# Patient Record
Sex: MALE | Race: WHITE
[De-identification: names, ages, dates, MRNs, and addresses within clinical notes are randomized per-mention and may not be internally consistent; named-entity substitution may affect disease eponyms.]

---

## 2018-03-22 ENCOUNTER — HOSPITAL ENCOUNTER (INPATIENT)
Dept: HOSPITAL 43 - DL.ED | Age: 38
LOS: 2 days | Discharge: HOME | DRG: 134 | End: 2018-03-24
Attending: HOSPITALIST | Admitting: HOSPITALIST
Payer: COMMERCIAL

## 2018-03-22 DIAGNOSIS — I26.99: Primary | ICD-10-CM

## 2018-03-22 DIAGNOSIS — I82.401: ICD-10-CM

## 2018-03-22 LAB
CHLORIDE SERPL-SCNC: 103 MMOL/L (ref 101–111)
SODIUM SERPL-SCNC: 139 MMOL/L (ref 135–145)

## 2018-03-22 NOTE — EDM.PDOC
ED HPI GENERAL MEDICAL PROBLEM





- General


Chief Complaint: Chest Pain


Stated Complaint: CHEST SOB AND LEG PAIN


Time Seen by Provider: 03/22/18 20:30


Source of Information: Reports: Patient


History Limitations: Reports: No Limitations





- History of Present Illness


INITIAL COMMENTS - FREE TEXT/NARRATIVE: 





ED with complaint of right leg pain for one week, increasing chest pain over 

past since Saturday, minimal cough, feeling wheezy, No hx asthma, No fever. 

Decrease in activity tolerance.  Has been traveling since Saturday in car and 

by airplane. Family hx positive for blood clots. 


  ** Right Lower Leg


Pain Score (Numeric/FACES): 3





  ** Middle Chest


Pain Score (Numeric/FACES): 7





- Related Data


 Allergies











Allergy/AdvReac Type Severity Reaction Status Date / Time


 


No Known Allergies Allergy   Verified 03/23/18 00:08











Home Meds: 


 Home Meds





. [No Known Home Meds]  03/22/18 [History]











ED ROS GENERAL





- Review of Systems


Review Of Systems: See Below


Constitutional: Reports: Fatigue


HEENT: Reports: No Symptoms


Respiratory: Reports: Shortness of Breath, Wheezing, Pleuritic Chest Pain, 

Cough.  Denies: Hemoptysis


Cardiovascular: Reports: Chest Pain, Dyspnea on Exertion, Orthopnea.  Denies: 

Lightheadedness


GI/Abdominal: Reports: No Symptoms


Musculoskeletal: Reports: Leg Pain


Skin: Reports: No Symptoms


Neurological: Reports: No Symptoms





ED EXAM, GENERAL





- Physical Exam


Exam: See Below


Exam Limited By: No Limitations


General Appearance: Alert, No Apparent Distress, Anxious


Eye Exam: Bilateral Eye: EOMI


Ears: Normal External Exam, Normal Canal


Ear Exam: Bilateral Ear: Auricle Normal, Canal Normal, TM normal


Nose: Normal Inspection


Throat/Mouth: Normal Inspection


Head: Atraumatic, Normocephalic


Neck: Normal Inspection


Respiratory/Chest: Normal Breath Sounds


Cardiovascular: Normal Peripheral Pulses, Regular Rate, Rhythm


GI/Abdominal: Normal Bowel Sounds, Soft


Back Exam: Normal Inspection, Full Range of Motion


Extremities: Normal Inspection, Normal Range of Motion, Jairo's Sign (right), 

Leg Pain (right calf)


Neurological: Alert, Oriented, Normal Cognition


Psychiatric: Normal Affect, Normal Mood


Skin Exam: Warm, Dry, Intact, Normal Color





Course





- Vital Signs


Last Recorded V/S: 


 Last Vital Signs











Temp  97.7 F   03/22/18 22:53


 


Pulse  78   03/22/18 22:53


 


Resp  20   03/22/18 22:53


 


BP  150/91 H  03/22/18 22:53


 


Pulse Ox  100   03/22/18 22:53














- Orders/Labs/Meds


Orders: 


 Active Orders 24 hr











 Category Date Time Status


 


 Heparin Sodium/D5W [Heparin 25,000 Units in D5W 500 ML] Med  03/22/18 21:30 

Active





 25,000 units in 500 ml IV TITRATE   








 Medication Orders





Heparin Sodium/Dextrose (Heparin 25,000 Units In D5w 500 Ml)  25,000 units in 

500 mls @ 32.658 mls/hr IV TITRATE [facility]; 18 UNITS/KG/HR


   PRN Reason: Protocol


   Last Admin: 03/22/18 21:53  Dose: 18 units/kg/hr, 32.658 mls/hr


Sodium Chloride (Normal Saline)  1,000 mls @ 75 mls/hr IV ASDIRECTED [facility]


Ondansetron HCl (Zofran)  4 mg IVPUSH Q6H PRN


   PRN Reason: Nausea/Vomiting








Labs: 


 Laboratory Tests











  03/22/18 03/22/18 03/22/18 Range/Units





  20:40 20:40 20:40 


 


WBC  10.7 H    (5.0-10.0)  10^3/uL


 


RBC  5.62    (4.6-6.2)  10^6/uL


 


Hgb  16.8    (14.0-18.0)  g/dL


 


Hct  47.8    (40.0-54.0)  %


 


MCV  85.1    ()  fL


 


MCH  29.9    (27.0-34.0)  pg


 


MCHC  35.1 H    (33.0-35.0)  g/dL


 


Plt Count  207    (150-450)  10^3/uL


 


Neut % (Auto)  68.6    (42.2-75.2)  %


 


Lymph % (Auto)  22.9    (20.5-50.1)  %


 


Mono % (Auto)  6.1    (2-8)  %


 


Eos % (Auto)  2.2    (1.0-3.0)  %


 


Baso % (Auto)  0.2    (0.0-1.0)  %


 


PT   10.4   (9.0-12.0)  SEC


 


INR   1.0   (0.9-1.2)  


 


D-Dimer, Quantitative   2680 H   (0-400)  ng/mL


 


Sodium    139  (135-145)  mmol/L


 


Potassium    3.6  (3.6-5.0)  mmol/L


 


Chloride    103  (101-111)  mmol/L


 


Carbon Dioxide    27.0  (21.0-31.0)  mmol/L


 


Anion Gap    12.6  


 


BUN    13  (7-18)  mg/dL


 


Creatinine    0.9  (0.6-1.3)  mg/dL


 


Est Cr Clr Drug Dosing    118.29  mL/min


 


Estimated GFR (MDRD)    > 60  


 


BUN/Creatinine Ratio    14.44  


 


Glucose    115 H  ()  mg/dL


 


Calcium    9.2  (8.4-10.2)  mg/dl


 


Total Bilirubin    0.8  (0.2-1.0)  mg/dL


 


AST    34  (10-42)  IU/L


 


ALT    51  (10-60)  IU/L


 


Alkaline Phosphatase    101  ()  IU/L


 


Troponin I    < 0.02  (0.00-0.02)  ng/ml


 


Total Protein    7.2  (6.7-8.2)  g/dl


 


Albumin    4.4  (3.2-5.5)  g/dl


 


Globulin    2.8  


 


Albumin/Globulin Ratio    1.57  











Meds: 


Medications











Generic Name Dose Route Start Last Admin





  Trade Name Freq  PRN Reason Stop Dose Admin


 


Heparin Sodium/Dextrose  25,000 units in 500 mls @ 32.658 mls/hr  03/22/18 21:

30  03/22/18 21:53





  Heparin 25,000 Units In D5w 500 Ml  IV   18 units/kg/hr





  TITRATE [facility]   32.658 mls/hr





  Protocol   Administration





  18 UNITS/KG/HR   


 


Sodium Chloride  1,000 mls @ 75 mls/hr  03/22/18 22:45  





  Normal Saline  IV   





  ASDIRECTED [facility]   


 


Ondansetron HCl  4 mg  03/22/18 22:32  





  Zofran  IVPUSH   





  Q6H PRN   





  Nausea/Vomiting   














Discontinued Medications














Generic Name Dose Route Start Last Admin





  Trade Name Freq  PRN Reason Stop Dose Admin


 


Heparin Sodium (Porcine)  5,000 units  03/22/18 21:18  03/22/18 21:51





  Heparin Sodium  IVPUSH  03/22/18 21:19  5,000 units





  ONETIME ONE   Administration


 


Heparin Sodium/Dextrose  Confirm  03/22/18 21:30  03/22/18 21:37





  Heparin 25,000 Units In D5w 500 Ml  Administered  03/22/18 21:31  Not Given





  Dose   





  500 mls @ as directed   





  .ROUTE   





  .STK-MED ONE   


 


Iopamidol  100 ml  03/22/18 21:20  03/22/18 21:30





  Isovue-370 (76%)  IVPUSH  03/22/18 21:21  100 ml





  ONETIME ONE   Administration














- Radiology Interpretation


Free Text/Narrative:: 





CXR negative


CT angio chest Thrombus in right main pulmonary artery as well as segmental 

branches to the right upper and right lower lobes, as well as segmental and 

subsegmental branches to the left lower lobe





- Re-Assessments/Exams


Free Text/Narrative Re-Assessment/Exam: 





03/22/18 22:08


Wells Criteria Scoring 7.5.


CT angio results pending, Heparin infusing


03/22/18 22:28


Dr Velasco accepting of patient for admission 








Departure





- Departure


Time of Disposition: 22:27


Disposition: Admitted As Inpatient 66


Condition: Good


Clinical Impression: 


Pulmonary embolism


Qualifiers:


 Pulmonary embolism type: other Chronicity: acute Acute cor pulmonale presence: 

without acute cor pulmonale Qualified Code(s): I26.99 - Other pulmonary 

embolism without acute cor pulmonale








- Discharge Information





- My Orders


Last 24 Hours: 


My Active Orders





03/22/18 21:30


Heparin Sodium/D5W [Heparin 25,000 Units in D5W 500 ML] 25,000 units in 500 ml 

IV TITRATE 














- Assessment/Plan


Last 24 Hours: 


My Active Orders





03/22/18 21:30


Heparin Sodium/D5W [Heparin 25,000 Units in D5W 500 ML] 25,000 units in 500 ml 

IV TITRATE

## 2018-03-23 NOTE — EKG
03/23/2018 - ANNIE QUIROZ -

 

TIME:  2023 hours.

 

EKG shows normal sinus rhythm with a rate of 74 per minute.  Nonspecific T-wave

abnormalities.

 

DD:  03/23/2018 17:44:26

DT:  03/23/2018 19:55:10

[facility]

Job #:  [id number]

## 2018-03-23 NOTE — PCM.HP
H&P History of Present Illness





- General


Date of Service: 03/22/18


Admit Problem/Dx: 


 Admission Diagnosis/Problem





Admission Diagnosis/Problem      Shortness of breath at rest








Source of Information: Patient


History Limitations: Reports: No Limitations





- History of Present Illness


Initial Comments - Free Text/Narative: 





The patient presented with complaint of right leg pain that has been going on 

for one week. Gradually worsened and he developed associated shortness of 

breath.Also started having chest pain at the retrosternal area. The patient 

denies fever chills, nausea or vomiting.


The patient's sister has a history of protein S deficiency and has had blood 

clots. Patient's mother also has had blood clots.


Patient was on a long distance travel. However he started having pain of the 

right lower extremity before the trip


Onset of Symptoms: Reports: Other (one week)


Duration of Symptoms: Reports: Getting Worse


Quality: Reports: Burning


Worsens with: Reports: Movement


Associated Symptoms: Reports: Chest Pain, Shortness of Breath


  ** Right Lower Leg


Pain Score (Numeric/FACES): 1





  ** Middle Chest


Pain Score (Numeric/FACES): 7





- Related Data


Allergies/Adverse Reactions: 


 Allergies











Allergy/AdvReac Type Severity Reaction Status Date / Time


 


No Known Allergies Allergy   Verified 03/23/18 00:08











Home Medications: 


 Home Meds





. [No Known Home Meds]  03/22/18 [History]











Past Medical History





- Past Health History


Medical/Surgical History: Denies Medical/Surgical History





- Infectious Disease History


Infectious Disease History: Reports: Chicken Pox, Other (See Below)


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 

pt does not know how to spell)





Social & Family History





- Family History


Cardiac: Reports: Heart Failure


Respiratory: Reports: Interstitial Lung Disease, PE, Other (See Below)


Other Respiratory Family Hisory: in legs, both mother, brother and sister


GI: Reports: Cholelithiasis


Oncologic: Reports: None





- Tobacco Use


Smoking Status *Q: Never Smoker


Second Hand Smoke Exposure: No





- Caffeine Use


Caffeine Use: Reports: Soda





- Recreational Drug Use


Recreational Drug Use: No





H&P Review of Systems





- Review of Systems:


Review Of Systems: See Below


General: Reports: No Symptoms


HEENT: Reports: No Symptoms


Pulmonary: Reports: No Symptoms, Shortness of Breath, Wheezing


Gastrointestinal: Reports: No Symptoms


Genitourinary: Reports: No Symptoms


Musculoskeletal: Reports: No Symptoms


Skin: Reports: No Symptoms


Psychiatric: Reports: No Symptoms


Hematologic/Lymphatic: Reports: No Symptoms





Exam





- Exam


Exam: See Below





- Vital Signs


Vital Signs: 


 Last Vital Signs











Temp  37.1 C   03/23/18 07:00


 


Pulse  66   03/23/18 07:00


 


Resp  14   03/23/18 07:00


 


BP  112/62   03/23/18 07:00


 


Pulse Ox  96   03/23/18 07:00











Weight: 91.263 kg





- Exam


General: Alert, Oriented, 4


HEENT: PERRLA, Hearing Intact, Mucosa Moist & Pink, Nares Patent, Normal Nasal 

Septum, Posterior Pharynx Clear, Conjunctiva Clear, EOMI, EACs Clear, TMs Clear


Lungs: Clear to Auscultation, Normal Respiratory Effort


Cardiovascular: Regular Rate, Regular Rhythm


GI/Abdominal Exam: Normal Bowel Sounds, Soft, Non-Tender, No Organomegaly, No 

Distention, No Abnormal Bruit, No Mass, Pelvis Stable


Back Exam: Normal Inspection, Full Range of Motion, NT


Extremities: Leg Pain


Neuro Extensive - Motor, Sensory, Reflexes: CN II-XII Intact, Normal Gait, 

Normal Reflexes


Psychiatric: Alert, Normal Affect





- Patient Data


Lab Results Last 24 hrs: 


 Laboratory Results - last 24 hr











  03/23/18 03/23/18 Range/Units





  03:55 10:03 


 


APTT  65.0 H  80.4 H  (22.0-34.0)  SEC











Result Diagrams: 


 03/22/18 20:40





 03/22/18 20:40





*Q Meaningful Use (ADM)





- VTE *Q


VTE Criteria *Q: 








- Stroke *Q


Stroke Criteria *Q: 








- AMI *Q


AMI Criteria *Q: 





Problem List Initiated/Reviewed/Updated: Yes


Orders Last 24hrs: 


 Active Orders 24 hr











 Category Date Time Status


 


 Venous Doppler Lwr Ext Bi [US] Routine Exams  03/23/18 10:21 Ordered


 


 Enoxaparin [Lovenox] Med  03/23/18 13:00 Ordered





 100 mg SUBCUT Q12HR   


 


 Warfarin Pharmacy to Dose [Pharmacy to Dose - Warfarin] Med  03/23/18 10:45 

Ordered





 1 dose .XX ASDIRECTED   








 Medication Orders





Enoxaparin Sodium (Lovenox)  100 mg SUBCUT Q12HR [facility]


Sodium Chloride (Normal Saline)  1,000 mls @ 75 mls/hr IV ASDIRECTED [facility]


   Last Admin: 03/23/18 04:51  Dose: 75 mls/hr


Ondansetron HCl (Zofran)  4 mg IVPUSH Q6H PRN


   PRN Reason: Nausea/Vomiting


Warfarin Sodium (Pharmacy To Dose - Warfarin)  1 dose .XX ASDIRECTED [facility]








Assessment/Plan Comment:: 





#. Acute pulmonary embolism


CT scan of the chest showed bilateral pulmonary embolism.


Patient has clot in the right pulmonary artery also





#. Right lower extremity pain


Patient likely has DVT of the right lower extremity.


The patient him back on a long distance travel area has a family history of 

protein S deficiency





#. Chest pain


This is as a result of acute pulmonary embolism





Plan:


Admit patient to medical floor


Start patient on continuous intravenous heparin weight-based protocol


Monitor PTT


Supplemental oxygen 


Obtain echocardiogram


We'll obtain ultrasound of right lower extremity


Discussed with the emergency room physician assistant

## 2018-03-23 NOTE — PCM.PN
- General Info


Date of Service: 03/23/18


Subjective Update: 





Patient was admitted with acute pulmonary embolism


Complains of pain right lower extremity


Does have some shortness of breath





- Review of Systems


General: Reports: No Symptoms


Pulmonary: Reports: Shortness of Breath


Cardiovascular: Reports: No Symptoms


Gastrointestinal: Reports: No Symptoms


Musculoskeletal: Reports: Leg Pain





- Patient Data


Vitals - Most Recent: 


 Last Vital Signs











Temp  36.4 C   03/23/18 11:00


 


Pulse  82   03/23/18 11:00


 


Resp  14   03/23/18 07:00


 


BP  148/80 H  03/23/18 11:00


 


Pulse Ox  95   03/23/18 11:00











Weight - Most Recent: 91.263 kg


I&O - Last 24 Hours: 


 Intake & Output











 03/22/18 03/23/18 03/23/18





 22:59 06:59 14:59


 


Intake Total  228 125


 


Output Total  400 400


 


Balance  -172 -275











Lab Results Last 24 Hours: 


 Laboratory Results - last 24 hr











  03/23/18 03/23/18 Range/Units





  03:55 10:03 


 


APTT  65.0 H  80.4 H  (22.0-34.0)  SEC











Med Orders - Current: 


 Current Medications





Enoxaparin Sodium (Lovenox)  100 mg SUBCUT Q12HR [facility]


Sodium Chloride (Normal Saline)  1,000 mls @ 75 mls/hr IV ASDIRECTED [facility]


   Last Admin: 03/23/18 04:51 Dose:  75 mls/hr


Ondansetron HCl (Zofran)  4 mg IVPUSH Q6H PRN


   PRN Reason: Nausea/Vomiting


Warfarin Sodium (Pharmacy To Dose - Warfarin)  1 dose .XX ASDIRECTED [facility]





Discontinued Medications





Heparin Sodium (Porcine) (Heparin Sodium)  5,000 units IVPUSH ONETIME ONE


   Stop: 03/22/18 21:19


   Last Admin: 03/22/18 21:51 Dose:  5,000 units


Heparin Sodium/Dextrose (Heparin 25,000 Units In D5w 500 Ml)  25,000 units in 

500 mls @ 32.658 mls/hr IV TITRATE [facility]; 18 UNITS/KG/HR


   PRN Reason: Protocol


   Last Titration: 03/23/18 10:35 Dose:  16 units/kg/hr, 29.03 mls/hr


Heparin Sodium/Dextrose (Heparin 25,000 Units In D5w 500 Ml) Confirm 

Administered Dose 500 mls @ as directed .ROUTE .STK-MED ONE


   Stop: 03/22/18 21:31


   Last Admin: 03/22/18 21:37 Dose:  Not Given


Iopamidol (Isovue-370 (76%))  100 ml IVPUSH ONETIME ONE


   Stop: 03/22/18 21:21


   Last Admin: 03/22/18 21:30 Dose:  100 ml











- Exam


General: Alert, Cooperative, No Acute Distress


Lungs: Clear to Auscultation


Cardiovascular: Regular Rate


GI/Abdominal Exam: Normal Bowel Sounds, Soft


Extremities: Leg Pain





- Problem List Review


Problem List Initiated/Reviewed/Updated: Yes





- My Orders


Last 24 Hours: 


My Active Orders





03/23/18 10:21


Venous Doppler Lwr Ext Bi [US] Routine 





03/23/18 10:45


Warfarin Pharmacy to Dose [Pharmacy to Dose - Warfarin]   1 dose .XX ASDIRECTED 





03/23/18 13:00


Enoxaparin [Lovenox]   100 mg SUBCUT Q12HR 














- Plan


Plan:: 





#. Acute pulmonary embolism


CT scan of the chest showed bilateral pulmonary embolism.


Patient has clot in the right pulmonary artery also





#. Right lower extremity pain


Patient likely has DVT of the right lower extremity.


The patient him back on a long distance travel area has a family history of 

protein S deficiency





#. Chest pain


This is as a result of acute pulmonary embolism





Plan:


Discontinue continuous intravenous heparin


Start patient on Lovenox 100 mg every 12 hours


Start patient on Coumadin


Monitor PT and INR


Obtain ultrasound of the lower extremities


Obtain echocardiogram

## 2018-03-24 NOTE — PCM.DCSUM1
**Discharge Summary





- Hospital Course


Free Text/Narrative:: 








The patient presented with complaint of chest pain and shortness of breath 

which was going on for one week. He had a CT scan of the chest which showed 

bilateral pulmonary embolism. There were clots in the right pulmonary artery. 

We proceeded to obtain ultrasound of the extremities there was no evidence of 

clots. Patient's sister has history of protein S deficiency. The patient should 

be tested as outpatient for hypercoagulable conditions. The test should be done 

includes protein C and S deficiency as a, factor V Leiden deficiency, 

prothrombin complex, lupus anticoagulant ect


The patient has been started on subcutaneous Lovenox and Coumadin. He will have 

PT and INR checked in 3 days I will follow up with primary care provider in one 

week





#. Acute pulmonary embolism


CT scan of the chest showed bilateral pulmonary embolism.


Patient has clot in the right pulmonary artery also





#. Right lower extremity pain


Patient likely has DVT of the right lower extremity.


The patient him back on a long distance travel area has a family history of 

protein S deficiency





#. Chest pain


This is as a result of acute pulmonary embolism





- Discharge Data


Discharge Date: 03/24/18


Discharge Disposition: Home, Self-Care 01


Condition: Stable





- Patient Instructions


Diet: Regular Diet as Tolerated


Activity: As Tolerated


Driving: [place] Drive Today


Showering/Bathing: May Shower





- Discharge Plan


Prescriptions/Med Rec: 


Enoxaparin [Lovenox] 100 mg SUBCUT Q12H 5 Days #10 syringe


Warfarin Sodium [Coumadin] 6 mg PO DAILY #20 tablet


Home Medications: 


 Home Meds





Acetaminophen [Tylenol] 650 mg PO Q6H PRN  tablet 03/24/18 [Rx]


Enoxaparin [Lovenox] 100 mg SUBCUT Q12H 5 Days #10 syringe 03/24/18 [Rx]


Warfarin Sodium [Coumadin] 6 mg PO DAILY #20 tablet 03/24/18 [Rx]








Patient Handouts:  What You Need to Know About Warfarin, Vascular Ultrasound, 

Enoxaparin injection


Referrals: 


PCP,Unobtain [Ordering Only Provider] - 





- General Info


Admission Dx/Problem (Free Text: 


 Admission Diagnosis/Problem





Admission Diagnosis/Problem      Shortness of breath at rest











- Review of Systems


General: Reports: No Symptoms


Pulmonary: Reports: Shortness of Breath


Cardiovascular: Reports: No Symptoms


Gastrointestinal: Reports: No Symptoms


Genitourinary: Reports: No Symptoms


Skin: Reports: No Symptoms





- Patient Data


Vitals - Most Recent: 


 Last Vital Signs











Temp  36.7 C   03/24/18 07:00


 


Pulse  69   03/24/18 07:00


 


Resp  18   03/24/18 07:00


 


BP  115/65   03/24/18 07:00


 


Pulse Ox  95   03/24/18 07:00











Weight - Most Recent: 91.263 kg


I&O - Last 24 hours: 


 Intake & Output











 03/23/18 03/24/18 03/24/18





 22:59 06:59 14:59


 


Intake Total 920 400 


 


Output Total 500 900 


 


Balance 420 -500 











Lab Results - Last 24 hrs: 


 Laboratory Results - last 24 hr











  03/23/18 03/23/18 Range/Units





  10:03 13:00 


 


APTT  80.4 H   (22.0-34.0)  SEC


 


Troponin I   < 0.02  (0.00-0.02)  ng/ml











Med Orders - Current: 


 Current Medications





Acetaminophen (Tylenol)  650 mg PO Q6H PRN


   PRN Reason: Pain


   Last Admin: 03/23/18 18:12 Dose:  650 mg


Enoxaparin Sodium (Lovenox)  100 mg SUBCUT Q12H [facility]


   Last Admin: 03/24/18 05:48 Dose:  100 mg


Morphine Sulfate (Morphine)  2 mg IVPUSH Q4H PRN


   PRN Reason: Pain


Ondansetron HCl (Zofran)  4 mg IVPUSH Q6H PRN


   PRN Reason: Nausea/Vomiting


Warfarin Sodium (Pharmacy To Dose - Warfarin)  1 dose .XX ASDIRECTED [facility]





Discontinued Medications





Heparin Sodium (Porcine) (Heparin Sodium)  5,000 units IVPUSH ONETIME ONE


   Stop: 03/22/18 21:19


   Last Admin: 03/22/18 21:51 Dose:  5,000 units


Heparin Sodium/Dextrose (Heparin 25,000 Units In D5w 500 Ml)  25,000 units in 

500 mls @ 32.658 mls/hr IV TITRATE [facility]; 18 UNITS/KG/HR


   PRN Reason: Protocol


   Last Titration: 03/23/18 10:35 Dose:  16 units/kg/hr, 29.03 mls/hr


Heparin Sodium/Dextrose (Heparin 25,000 Units In D5w 500 Ml) Confirm 

Administered Dose 500 mls @ as directed .ROUTE .STK-MED ONE


   Stop: 03/22/18 21:31


   Last Admin: 03/22/18 21:37 Dose:  Not Given


Sodium Chloride (Normal Saline)  1,000 mls @ 75 mls/hr IV ASDIRECTED [facility]


   Last Admin: 03/23/18 04:51 Dose:  75 mls/hr


Iopamidol (Isovue-370 (76%))  100 ml IVPUSH ONETIME ONE


   Stop: 03/22/18 21:21


   Last Admin: 03/22/18 21:30 Dose:  100 ml


Morphine Sulfate (Morphine)  2 mg IVPUSH ONETIME ONE


   Stop: 03/23/18 12:45


   Last Admin: 03/23/18 12:49 Dose:  2 mg


Warfarin Sodium (Coumadin)  10 mg PO ONETIME ONE


   Stop: 03/23/18 14:01


   Last Admin: 03/23/18 13:54 Dose:  10 mg











*Q Meaningful Use (DIS)





- VTE *Q


VTE Criteria *Q: 








- Stroke *Q


Stroke Criteria *Q: 








- AMI *Q


AMI Criteria *Q:

## 2018-03-28 ENCOUNTER — HOSPITAL ENCOUNTER (EMERGENCY)
Dept: HOSPITAL 43 - DL.ED | Age: 38
Discharge: NURSING, REHAB OR LONG-TERM CARE | End: 2018-03-28
Payer: COMMERCIAL

## 2018-03-28 DIAGNOSIS — Z79.01: ICD-10-CM

## 2018-03-28 DIAGNOSIS — I26.99: Primary | ICD-10-CM

## 2018-03-28 LAB
CHLORIDE SERPL-SCNC: 100 MMOL/L (ref 101–111)
SODIUM SERPL-SCNC: 138 MMOL/L (ref 135–145)

## 2018-03-28 PROCEDURE — 85379 FIBRIN DEGRADATION QUANT: CPT

## 2018-03-28 PROCEDURE — 96375 TX/PRO/DX INJ NEW DRUG ADDON: CPT

## 2018-03-28 PROCEDURE — 96374 THER/PROPH/DIAG INJ IV PUSH: CPT

## 2018-03-28 PROCEDURE — 80053 COMPREHEN METABOLIC PANEL: CPT

## 2018-03-28 PROCEDURE — 71260 CT THORAX DX C+: CPT

## 2018-03-28 PROCEDURE — 85610 PROTHROMBIN TIME: CPT

## 2018-03-28 PROCEDURE — 96376 TX/PRO/DX INJ SAME DRUG ADON: CPT

## 2018-03-28 PROCEDURE — 36415 COLL VENOUS BLD VENIPUNCTURE: CPT

## 2018-03-28 PROCEDURE — 85025 COMPLETE CBC W/AUTO DIFF WBC: CPT

## 2018-03-28 PROCEDURE — 96372 THER/PROPH/DIAG INJ SC/IM: CPT

## 2018-03-28 PROCEDURE — 93005 ELECTROCARDIOGRAM TRACING: CPT

## 2018-03-28 PROCEDURE — 99285 EMERGENCY DEPT VISIT HI MDM: CPT

## 2018-03-28 PROCEDURE — 84484 ASSAY OF TROPONIN QUANT: CPT

## 2018-03-28 NOTE — EDM.PDOC
ED HPI GENERAL MEDICAL PROBLEM





- General


Stated Complaint: [id number] BLOOD CLOT IN LUNG CHEST PAIN


Time Seen by Provider: 03/28/18 07:20


Source of Information: Reports: Patient, Old Records, RN, RN Notes Reviewed


History Limitations: Reports: No Limitations





- History of Present Illness


INITIAL COMMENTS - FREE TEXT/NARRATIVE: 


Pk is a 36 yo male who presents to the ED due to shortness of breath. He 

relates that he was diagnosed with a PE and was discharged last Thursday. He 

relates that he started experiencing shortness of breath on 3/17/18 after he he 

returned from vacation in which he was in the car for a prolonged period of 

time. He relates that 15 minutes ago he woke up with severe pain to the left 

side of his chest, increased shortness of breath, nausea, and diaphoresis. He 

describes the pain as sharp stabbing pain to his left chest. Denies radiation. 

He reports that he has continued his blood thinners since hospital discharge 

including lovenox and Coumadin. He has a family hx of clotting disorders. 

Denies fever or abd pain. 





Onset: Today


Onset Time: 07:00


Duration: Minutes:


Location: Reports: Chest


Quality: Reports: Sharp, Stabbing


Severity: Severe


Improves with: Reports: Medication


Worsens with: Reports: Movement


Associated Symptoms: Reports: Chest Pain, Shortness of Breath


  ** Left Upper Chest


Pain Score (Numeric/FACES): 10





- Related Data


 Allergies











Allergy/AdvReac Type Severity Reaction Status Date / Time


 


No Known Allergies Allergy   Verified 03/23/18 00:08











Home Meds: 


 Home Meds





Acetaminophen [Tylenol] 650 mg PO Q6H PRN  tablet 03/24/18 [Rx]


Enoxaparin [Lovenox] 100 mg SUBCUT Q12H 5 Days #10 syringe 03/24/18 [Rx]


Warfarin Sodium [Coumadin] 6 mg PO DAILY #20 tablet 03/24/18 [Rx]











Past Medical History





- Past Health History


Medical/Surgical History: Denies Medical/Surgical History





- Infectious Disease History


Infectious Disease History: Reports: Chicken Pox, Other (See Below)


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 

pt does not know how to spell)





Social & Family History





- Family History


Cardiac: Reports: Heart Failure


Respiratory: Reports: Interstitial Lung Disease, PE, Other (See Below)


Other Respiratory Family Hisory: in legs, both mother, brother and sister


GI: Reports: Cholelithiasis


Oncologic: Reports: None





- Tobacco Use


Smoking Status *Q: Never Smoker


Second Hand Smoke Exposure: No





- Caffeine Use


Caffeine Use: Reports: Soda





- Recreational Drug Use


Recreational Drug Use: No





ED ROS GENERAL





- Review of Systems


Review Of Systems: ROS reveals no pertinent complaints other than HPI.





ED EXAM, GENERAL





- Physical Exam


Exam: See Below


Exam Limited By: No Limitations


General Appearance: Alert, Severe Distress (He appears to be having significant 

pain on assessment. Unable to get comfortable on the cart due to pain and 

shortness of breath)


Eye Exam: Bilateral Eye: PERRL


Ears: Normal External Exam, Normal Canal, Hearing Grossly Normal, Normal TMs


Ear Exam: Bilateral Ear: Auricle Normal, Canal Normal, TM normal


Nose: Normal Inspection, Normal Mucosa, No Blood


Throat/Mouth: Normal Inspection, Normal Lips, Normal Teeth, Normal Gums, Normal 

Oropharynx, Normal Voice, No Airway Compromise


Head: Atraumatic, Normocephalic


Neck: Normal Inspection, Supple, Non-Tender, Full Range of Motion


Respiratory/Chest: Lungs Clear, No Accessory Muscle Use, Decreased Breath Sounds

, Other (Patient reports pain to left side of chest.)


Cardiovascular: Normal Peripheral Pulses, Regular Rate, Rhythm, No Edema, No 

Gallop, No JVD, No Murmur, No Rub


GI/Abdominal: Normal Bowel Sounds, Soft, Non-Tender, No Organomegaly, No 

Distention, No Abnormal Bruit, No Mass


 (Male) Exam: Deferred


Rectal (Males) Exam: Deferred


Back Exam: Normal Inspection, Full Range of Motion, NT


Extremities: Normal Inspection, Normal Range of Motion, Non-Tender, Normal 

Capillary Refill, No Pedal Edema


Neurological: Alert, Oriented, CN II-XII Intact, Normal Cognition, Normal Gait, 

Normal Reflexes, No Motor/Sensory Deficits


Psychiatric: Normal Affect, Normal Mood


Skin Exam: Warm, Intact, Normal Color, No Rash, Diaphoretic


Lymphatic: No Adenopathy





EKG INTERPRETATION


EKG Date: 03/28/18


Time: 07:18


Rhythm: NSR


Axis: Normal


P-Wave: Present


QRS: Normal


ST-T: Normal


QT: Normal


Comparison: No Change





Course





- Vital Signs


Last Recorded V/S: 


 Last Vital Signs











Temp  98.8 F   03/28/18 07:31


 


Pulse  74   03/28/18 07:50


 


Resp  20   03/28/18 07:50


 


BP  124/88   03/28/18 07:50


 


Pulse Ox  98   03/28/18 07:50














- Orders/Labs/Meds


Orders: 


 Active Orders 24 hr











 Category Date Time Status


 


 EKG Documentation Completion [RC] URGENT Care  03/28/18 07:16 Active


 


 Chest w Cont [CT] Urgent Exams  03/28/18 07:59 Taken











Labs: 


 Laboratory Tests











  03/28/18 03/28/18 03/28/18 Range/Units





  07:29 07:29 07:29 


 


WBC   8.8   (5.0-10.0)  10^3/uL


 


RBC   5.67   (4.6-6.2)  10^6/uL


 


Hgb   16.9   (14.0-18.0)  g/dL


 


Hct   48.6   (40.0-54.0)  %


 


MCV   85.7   ()  fL


 


MCH   29.8   (27.0-34.0)  pg


 


MCHC   34.8   (33.0-35.0)  g/dL


 


Plt Count   268   (150-450)  10^3/uL


 


Neut % (Auto)   64.2   (42.2-75.2)  %


 


Lymph % (Auto)   21.9   (20.5-50.1)  %


 


Mono % (Auto)   10.3 H   (2-8)  %


 


Eos % (Auto)   3.1 H   (1.0-3.0)  %


 


Baso % (Auto)   0.5   (0.0-1.0)  %


 


PT  17.3 H D    (9.0-12.0)  SEC


 


INR  1.7 H    (0.9-1.2)  


 


D-Dimer, Quantitative  1460 H    (0-400)  ng/mL


 


Sodium    138  (135-145)  mmol/L


 


Potassium    4.1  (3.6-5.0)  mmol/L


 


Chloride    100 L  (101-111)  mmol/L


 


Carbon Dioxide    28.0  (21.0-31.0)  mmol/L


 


Anion Gap    14.1  


 


BUN    12  (7-18)  mg/dL


 


Creatinine    1.0  (0.6-1.3)  mg/dL


 


Est Cr Clr Drug Dosing    106.46  mL/min


 


Estimated GFR (MDRD)    > 60  


 


BUN/Creatinine Ratio    12.00  


 


Glucose    108 H  ()  mg/dL


 


Calcium    9.3  (8.4-10.2)  mg/dl


 


Total Bilirubin    0.9  (0.2-1.0)  mg/dL


 


AST    73 H  (10-42)  IU/L


 


ALT    216 H  (10-60)  IU/L


 


Alkaline Phosphatase    179 H  ()  IU/L


 


Troponin I    < 0.02  (0.00-0.02)  ng/ml


 


Total Protein    7.9  (6.7-8.2)  g/dl


 


Albumin    4.3  (3.2-5.5)  g/dl


 


Globulin    3.6  


 


Albumin/Globulin Ratio    1.19  











Meds: 


Medications














Discontinued Medications














Generic Name Dose Route Start Last Admin





  Trade Name Freq  PRN Reason Stop Dose Admin


 


Enoxaparin Sodium  100 mg  03/28/18 09:13  





  Lovenox  SUBCUT  03/28/18 09:14  





  ONETIME ONE   





     





     





     





     


 


Iopamidol  100 ml  03/28/18 07:59  03/28/18 08:34





  Isovue-370 (76%)  IVPUSH  03/28/18 08:00  75 ml





  ONETIME ONE   Administration





     





     





     





     


 


Morphine Sulfate  2 mg  03/28/18 07:23  03/28/18 07:29





  Morphine  IVPUSH  03/28/18 07:24  2 mg





  ONETIME ONE   Administration





     





     





     





     


 


Morphine Sulfate  2 mg  03/28/18 08:29  03/28/18 08:33





  Morphine  IVPUSH  03/28/18 08:30  2 mg





  ONETIME ONE   Administration





     





     





     





     


 


Ondansetron HCl  4 mg  03/28/18 07:34  03/28/18 07:40





  Zofran  IV  03/28/18 07:35  4 mg





  ONETIME ONE   Administration





     





     





     





     














Departure





- Departure


Time of Disposition: 09:15


Disposition: DC/Tfer to Acute Hospital 02


Clinical Impression: 


Pulmonary embolism


Qualifiers:


 Pulmonary embolism type: other Chronicity: acute Acute cor pulmonale presence: 

without acute cor pulmonale Qualified Code(s): I26.99 - Other pulmonary 

embolism without acute cor pulmonale








- Discharge Information


Forms:  Interfacility Transfer EMTALA

## 2018-03-28 NOTE — EKG
03/28/2018 - ANNIE QUIROZ -

 

TIME:  07:18 a.m.

 

EKG shows normal sinus rhythm, rate of 72 per minute.  Nonspecific T-wave

abnormalities in inferior leads.

 

DD:  03/28/2018 15:12:23

DT:  03/28/2018 16:09:28

[facility]

Job #:  [id number]

## 2018-03-28 NOTE — CT
CLINICAL HISTORY: 37-year-old 206 pound male with severe substernal chest pain, left of midline, and 
abnormally elevated serum D dimer (1460) who, significantly, was reported one week ago to have "exten
sive, bilateral pulmonary artery thrombosis (PE)" that was subsequently treated with heparin, Lovenox
 and now Coumadin. Reevaluate please.

 

SCAN TECHNIQUE: Volume acquisition of data from an emergency unenhanced CT scan of the chest (bony th
orax, lungs and mediastinum) obtained while this patient was lying supine on the Siemens multislice C
T scanner [place], [place]. All data archived in the PAC system for st
orage, reformatting axial/sagittal/coronal planes and study.

 

INTERPRETATION: Abnormal.

 

1. Interval resolution, i.e., clearing of the extensive intraluminal thrombosis causing filling defec
ts in the pulmonary artery circulation of both lungs demonstrated on the 22 March 2018 CT exam.

2. *Patchy round new peripheral pleural-based infiltrates or infarcts anterior segment RLL and lingul
ar segment URSULA today.

3. *Bibasilar accumulation small dependent new subpulmonic pleural effusions (R>L). Underlying atelec
tasis posteriorly RLL.

4. Normal cardiac silhouette. No pericardial effusion, cephalization of flow or signs of alveolar rajat
ma.

5. No parenchymal lung mass lesion or hilar/mediastinal lymphadenopathy. No lobar pneumonia.

6. Normal caliber thoracic aorta without sign of aneurysm or dissection. Normal dorsal spine.

## 2018-03-31 NOTE — US
Clinical history: 37-year-old male "pulmonary embolism". Rule out occult lower extremity DVT.

 

Interpretation: Negative exam.

 

No sign of intraluminal echogenic thrombus and normal compressibility deep veins both groin, thigh, k
nee and calves. Spontaneous flow in the posterior tibial and anterior tibial veins both lower extremi
ties. Augmented flow in the popliteal vein behind the knees and femoral veins both thighs.

 

No Baker's cyst.

 

CONCLUSION: No current sonographic evidence deep vein thrombosis either lower extremity i.e. negative
 exam.
pacemaker: good capture, regular rhythm and appropriate intervals.

## 2019-02-06 ENCOUNTER — HOSPITAL ENCOUNTER (EMERGENCY)
Dept: HOSPITAL 43 - DL.ED | Age: 39
Discharge: HOME | End: 2019-02-06
Payer: COMMERCIAL

## 2019-02-06 DIAGNOSIS — R07.89: Primary | ICD-10-CM

## 2019-02-06 LAB
ANION GAP SERPL CALC-SCNC: 11.8 MMOL/L
CHLORIDE SERPL-SCNC: 103 MMOL/L (ref 101–111)
SODIUM SERPL-SCNC: 138 MMOL/L (ref 135–145)

## 2019-02-06 PROCEDURE — 85730 THROMBOPLASTIN TIME PARTIAL: CPT

## 2019-02-06 PROCEDURE — 85025 COMPLETE CBC W/AUTO DIFF WBC: CPT

## 2019-02-06 PROCEDURE — 85610 PROTHROMBIN TIME: CPT

## 2019-02-06 PROCEDURE — 83690 ASSAY OF LIPASE: CPT

## 2019-02-06 PROCEDURE — 93005 ELECTROCARDIOGRAM TRACING: CPT

## 2019-02-06 PROCEDURE — 99285 EMERGENCY DEPT VISIT HI MDM: CPT

## 2019-02-06 PROCEDURE — 84484 ASSAY OF TROPONIN QUANT: CPT

## 2019-02-06 PROCEDURE — 71260 CT THORAX DX C+: CPT

## 2019-02-06 PROCEDURE — 36415 COLL VENOUS BLD VENIPUNCTURE: CPT

## 2019-02-06 PROCEDURE — 80053 COMPREHEN METABOLIC PANEL: CPT

## 2019-02-06 NOTE — EDM.PDOC
Scribed by Nicole Kay 02/06/19 1821 for Andrea Pollard MD





<Andrea Pollard - Last Filed: 02/06/19 18:41>





ED HPI GENERAL MEDICAL PROBLEM





- General


Chief Complaint: Chest Pain


Stated Complaint: CHEST PAIN


Time Seen by Provider: 02/06/19 18:10


Source of Information: Reports: Patient, Old Records, RN, RN Notes Reviewed


History Limitations: Reports: No Limitations





- History of Present Illness


INITIAL COMMENTS - FREE TEXT/NARRATIVE: 


Patient presents to ER by POV with complaint of chest pain. He admits to 

shortness of breath. Patient has history of pulmonary embolism in March 2018. 

He was recently discontinued on his anticoagulants. There is a history of 

clotting disorder in sibling, parent and grandparent. Admits to some cough. 

Denies palpitations or edema. 


Onset: Today


Duration: Constant


Location: Reports: Chest


Quality: Reports: Same as Previous Episode


Severity: Moderate


Improves with: Reports: None


Worsens with: Reports: None


Associated Symptoms: Reports: No Other Symptoms





- Related Data


 Allergies











Allergy/AdvReac Type Severity Reaction Status Date / Time


 


No Known Allergies Allergy   Verified 02/06/19 18:35











Home Meds: 


 Home Meds





Acetaminophen [Tylenol] 650 mg PO Q6H PRN  tablet 03/24/18 [Rx]











Past Medical History





- Past Health History


Medical/Surgical History: Denies Medical/Surgical History


Cardiovascular History: Reports: Blood Clots/VTE/DVT


Respiratory History: Reports: PE





- Infectious Disease History


Infectious Disease History: Reports: Chicken Pox, Other (See Below)


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 

pt does not know how to spell)





Social & Family History





- Family History


Cardiac: Reports: Heart Failure


Respiratory: Reports: Interstitial Lung Disease, PE, Other (See Below)


Other Respiratory Family Hisory: in legs, both mother, brother and sister


GI: Reports: Cholelithiasis


Oncologic: Reports: None





- Tobacco Use


Smoking Status *Q: Never Smoker





- Caffeine Use


Caffeine Use: Reports: Soda





- Living Situation & Occupation


Living situation: Reports: with Family





ED ROS GENERAL





- Review of Systems


Review Of Systems: ROS reveals no pertinent complaints other than HPI.





ED EXAM, GENERAL





- Physical Exam


Exam: See Below


Exam Limited By: No Limitations


General Appearance: Alert, WD/WN, No Apparent Distress


Nose: Normal Inspection, Normal Mucosa, No Blood


Throat/Mouth: Normal Inspection, Normal Lips, Normal Teeth, Normal Gums, Normal 

Oropharynx, Normal Voice, No Airway Compromise


Head: Atraumatic, Normocephalic


Neck: Normal Inspection, Supple, Non-Tender, Full Range of Motion


Respiratory/Chest: No Respiratory Distress, Lungs Clear, Normal Breath Sounds, 

No Accessory Muscle Use, Chest Non-Tender


Cardiovascular: Regular Rate, Rhythm, No Edema


GI/Abdominal: Normal Bowel Sounds, Soft, Non-Tender


 (Male) Exam: Deferred


Rectal (Males) Exam: Deferred


Back Exam: Normal Inspection


Extremities: Normal Inspection, Normal Range of Motion, Non-Tender, No Pedal 

Edema.  No: Joint Swelling, Jairo's Sign, Increased Warmth, Redness


Neurological: Alert, Oriented, No Motor/Sensory Deficits


Psychiatric: Normal Mood


Skin Exam: Warm, Dry, Intact, Normal Color, No Rash





EKG INTERPRETATION


EKG Date: 02/06/19


Time: 18:21


Rhythm: Other (SR)


Rate (Beats/Min): 66


Axis: Normal


P-Wave: Present


QRS: Other (inferior Q waves)


ST-T: Normal


QT: Normal


EKG Interpretation Comments: 


S1, P2, T3 pattern





Course





- Vital Signs


Last Recorded V/S: 


 Last Vital Signs











Temp  98 F   02/06/19 18:08


 


Pulse  67   02/06/19 18:08


 


Resp  22 H  02/06/19 18:08


 


BP  149/77 H  02/06/19 18:08


 


Pulse Ox  97   02/06/19 18:08














- Orders/Labs/Meds


Orders: 


 Active Orders 24 hr











 Category Date Time Status


 


 EKG 12 Lead [EKG Documentation Completion] [RC] STAT Care  02/06/19 18:27 

Active











Labs: 


 Laboratory Tests











  02/06/19 02/06/19 02/06/19 Range/Units





  18:31 18:31 18:31 


 


WBC  8.7    (5.0-10.0)  10^3/uL


 


RBC  5.27    (4.6-6.2)  10^6/uL


 


Hgb  15.9    (14.0-18.0)  g/dL


 


Hct  44.9    (40.0-54.0)  %


 


MCV  85.2    ()  fL


 


MCH  30.2    (27.0-34.0)  pg


 


MCHC  35.4 H    (33.0-35.0)  g/dL


 


Plt Count  219    (150-450)  10^3/uL


 


Neut % (Auto)  66.6    (42.2-75.2)  %


 


Lymph % (Auto)  24.9    (20.5-50.1)  %


 


Mono % (Auto)  6.0    (2-8)  %


 


Eos % (Auto)  2.2    (1.0-3.0)  %


 


Baso % (Auto)  0.3    (0.0-1.0)  %


 


PT   10.2   (9.0-12.0)  SEC


 


INR   1.0   (0.9-1.2)  


 


APTT   23.0   (22.0-34.0)  SEC


 


Sodium    138  (135-145)  mmol/L


 


Potassium    3.8  (3.6-5.0)  mmol/L


 


Chloride    103  (101-111)  mmol/L


 


Carbon Dioxide    27.0  (21.0-31.0)  mmol/L


 


Anion Gap    11.8  


 


BUN    15  (7-18)  mg/dL


 


Creatinine    0.9  (0.6-1.3)  mg/dL


 


Est Cr Clr Drug Dosing    117.16  mL/min


 


Estimated GFR (MDRD)    > 60  


 


BUN/Creatinine Ratio    16.66  


 


Glucose    122 H  ()  mg/dL


 


Calcium    9.3  (8.4-10.2)  mg/dl


 


Total Bilirubin    0.8  (0.2-1.0)  mg/dL


 


AST    35  (10-42)  IU/L


 


ALT    52  (10-60)  IU/L


 


Alkaline Phosphatase    81  ()  IU/L


 


Troponin I    < 0.02  (0.00-0.02)  ng/ml


 


Total Protein    7.6  (6.7-8.2)  g/dl


 


Albumin    4.4  (3.2-5.5)  g/dl


 


Globulin    3.2  


 


Albumin/Globulin Ratio    1.38  


 


Lipase    34  (22-51)  U/L











Meds: 


Medications














Discontinued Medications














Generic Name Dose Route Start Last Admin





  Trade Name Freq  PRN Reason Stop Dose Admin


 


Sodium Chloride  1,000 mls @ 999 mls/hr  02/06/19 19:38  02/06/19 19:40





  Normal Saline  IV  02/06/19 20:38  999 mls/hr





  .BOLUS ONE   Administration





     





     





     





     


 


Iopamidol  100 ml  02/06/19 18:34  02/06/19 19:41





  Isovue-370 (76%)  IVPUSH  02/06/19 18:35  73 ml





  ONETIME ONE   Administration





     





     





     





     














- Re-Assessments/Exams


Free Text/Narrative Re-Assessment/Exam: 





02/06/19 19:00


Care of pt transferred to Maci Norman NP at shift change with CT PE study 

pending.





Departure





- Departure


Disposition: Home, Self-Care 01


Clinical Impression: 


 Nonspecific chest pain





Instructions:  Nonspecific Chest Pain, Easy-to-Read


Referrals: 


Alaina Arambula MD [Primary Care Provider] - 


Forms:  ED Department Discharge


Additional Instructions: 


Follow up with your primary care facility


Return to the ER if symptoms worsen. 








<NormanMaci - Last Filed: 02/07/19 01:56>





ED ROS GENERAL





- Review of Systems


Review Of Systems: ROS reveals no pertinent complaints other than HPI.





Course





- Radiology Interpretation


Free Text/Narrative:: 


Chest CT with contrast:


FINDINGS:


Lungs: There is dependent bibasilar atelectasis. No focal consolidation


Pleural space: Normal. No pneumothorax. No pleural effusion.


Heart: Normal. No cardiomegaly. No pericardial effusion.


Aorta: Normal. No aortic aneurysm.


Lymph nodes: Unremarkable. No enlarged lymph nodes.





Bones/joints: Unremarkable. No acute fracture.


Soft tissues: Unremarkable.


IMPRESSION:


No acute pulmonary embolism.


Note study is technically limited as the lung apices are not included on field-

of-view.


Thank you for allowing us to participate in the care of your patient.


Dictated and Authenticated by: Bj Prieto MD


02/06/2019 7:54 PM Central Time ([place] & [place])





See rad report








- Re-Assessments/Exams


Free Text/Narrative Re-Assessment/Exam: 





02/06/19 20:09


Patient care taken over from Dr. Pollard, awaiting PE study. PE study results 

negative. 


Patient sates he is not SOB. He states he was shoveling prior to the feeling in 

the left chest and was breathing in cold air. He states he feels the pain more 

so when he coughs. 


Patient is informed of the findings. It is recommended he follow up with his 

primary care facility. Patient agrees and states understanding. 


02/06/19 20:10








Departure





- Departure


Time of Disposition: 20:30


Condition: Fair





I have read and agree with the documentation that has been completed regarding 

this visit. By signing this record, I attest that the documentation was 

completed in my physical presence and is an accurate record of the encounter.